# Patient Record
Sex: MALE | Race: OTHER | Employment: UNEMPLOYED | URBAN - NONMETROPOLITAN AREA
[De-identification: names, ages, dates, MRNs, and addresses within clinical notes are randomized per-mention and may not be internally consistent; named-entity substitution may affect disease eponyms.]

---
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
## 2020-03-31 ENCOUNTER — HOSPITAL ENCOUNTER (INPATIENT)
Age: 20
LOS: 4 days | Discharge: HOME | DRG: 885 | End: 2020-04-04
Attending: PSYCHIATRY & NEUROLOGY | Admitting: PSYCHIATRY & NEUROLOGY
Payer: COMMERCIAL

## 2020-03-31 ENCOUNTER — HOSPITAL ENCOUNTER (EMERGENCY)
Age: 20
Discharge: ANOTHER HOSPITAL | End: 2020-03-31
Attending: FAMILY MEDICINE
Payer: COMMERCIAL

## 2020-03-31 ENCOUNTER — APPOINTMENT (OUTPATIENT)
Dept: GENERAL RADIOLOGY | Age: 20
End: 2020-03-31
Payer: COMMERCIAL

## 2020-03-31 VITALS
HEART RATE: 88 BPM | WEIGHT: 140 LBS | DIASTOLIC BLOOD PRESSURE: 82 MMHG | OXYGEN SATURATION: 100 % | RESPIRATION RATE: 24 BRPM | SYSTOLIC BLOOD PRESSURE: 123 MMHG | TEMPERATURE: 98.4 F

## 2020-03-31 PROBLEM — F33.9 MAJOR DEPRESSION, RECURRENT, CHRONIC (HCC): Status: ACTIVE | Noted: 2020-03-31

## 2020-03-31 LAB
ABSOLUTE EOS #: 0 K/UL (ref 0–0.4)
ABSOLUTE IMMATURE GRANULOCYTE: ABNORMAL K/UL (ref 0–0.3)
ABSOLUTE LYMPH #: 1.4 K/UL (ref 1.2–5.2)
ABSOLUTE MONO #: 0.6 K/UL (ref 0–1)
ACETAMINOPHEN LEVEL: <5 UG/ML (ref 10–30)
ALBUMIN SERPL-MCNC: 5 G/DL (ref 3.5–5.2)
ALBUMIN/GLOBULIN RATIO: ABNORMAL (ref 1–2.5)
ALP BLD-CCNC: 88 U/L (ref 40–129)
ALT SERPL-CCNC: 13 U/L (ref 5–41)
AMPHETAMINE SCREEN URINE: NEGATIVE
ANION GAP SERPL CALCULATED.3IONS-SCNC: 15 MMOL/L (ref 9–17)
AST SERPL-CCNC: 11 U/L
BARBITURATE SCREEN URINE: NEGATIVE
BASOPHILS # BLD: 0 % (ref 0–2)
BASOPHILS ABSOLUTE: 0 K/UL (ref 0–0.2)
BENZODIAZEPINE SCREEN, URINE: NEGATIVE
BILIRUB SERPL-MCNC: 0.35 MG/DL (ref 0.3–1.2)
BILIRUBIN URINE: NEGATIVE
BUN BLDV-MCNC: 9 MG/DL (ref 6–20)
BUN/CREAT BLD: 13 (ref 9–20)
BUPRENORPHINE URINE: ABNORMAL
CALCIUM SERPL-MCNC: 10.3 MG/DL (ref 8.6–10.4)
CANNABINOID SCREEN URINE: POSITIVE
CHLORIDE BLD-SCNC: 100 MMOL/L (ref 98–107)
CO2: 24 MMOL/L (ref 20–31)
COCAINE METABOLITE, URINE: NEGATIVE
COLOR: YELLOW
COMMENT UA: NORMAL
CREAT SERPL-MCNC: 0.7 MG/DL (ref 0.7–1.2)
DIFFERENTIAL TYPE: YES
EKG ATRIAL RATE: 102 BPM
EKG ATRIAL RATE: 96 BPM
EKG P AXIS: 47 DEGREES
EKG P AXIS: 54 DEGREES
EKG P-R INTERVAL: 132 MS
EKG P-R INTERVAL: 136 MS
EKG Q-T INTERVAL: 336 MS
EKG Q-T INTERVAL: 338 MS
EKG QRS DURATION: 76 MS
EKG QRS DURATION: 86 MS
EKG QTC CALCULATION (BAZETT): 427 MS
EKG QTC CALCULATION (BAZETT): 437 MS
EKG R AXIS: 12 DEGREES
EKG R AXIS: 24 DEGREES
EKG T AXIS: 47 DEGREES
EKG T AXIS: 52 DEGREES
EKG VENTRICULAR RATE: 102 BPM
EKG VENTRICULAR RATE: 96 BPM
EOSINOPHILS RELATIVE PERCENT: 0 % (ref 0–5)
ETHANOL PERCENT: <0.01 %
ETHANOL: <10 MG/DL
GFR AFRICAN AMERICAN: ABNORMAL ML/MIN
GFR NON-AFRICAN AMERICAN: ABNORMAL ML/MIN
GFR SERPL CREATININE-BSD FRML MDRD: ABNORMAL ML/MIN/{1.73_M2}
GFR SERPL CREATININE-BSD FRML MDRD: ABNORMAL ML/MIN/{1.73_M2}
GLUCOSE BLD-MCNC: 101 MG/DL (ref 70–99)
GLUCOSE URINE: NEGATIVE
HCT VFR BLD CALC: 46.1 % (ref 41–53)
HEMOGLOBIN: 15.4 G/DL (ref 13.5–17.5)
IMMATURE GRANULOCYTES: ABNORMAL %
KETONES, URINE: NEGATIVE
LEUKOCYTE ESTERASE, URINE: NEGATIVE
LYMPHOCYTES # BLD: 12 % (ref 13–44)
MAGNESIUM: 2.4 MG/DL (ref 1.7–2.2)
MCH RBC QN AUTO: 28.7 PG (ref 26–34)
MCHC RBC AUTO-ENTMCNC: 33.4 G/DL (ref 31–37)
MCV RBC AUTO: 86.1 FL (ref 80–100)
MDMA URINE: ABNORMAL
METHADONE SCREEN, URINE: NEGATIVE
METHAMPHETAMINE, URINE: NEGATIVE
MONOCYTES # BLD: 6 % (ref 5–9)
NITRITE, URINE: NEGATIVE
NRBC AUTOMATED: ABNORMAL PER 100 WBC
OPIATES, URINE: NEGATIVE
OXYCODONE SCREEN URINE: NEGATIVE
PDW BLD-RTO: 12.7 % (ref 12.1–15.2)
PH UA: 6 (ref 5–8)
PHENCYCLIDINE, URINE: NEGATIVE
PLATELET # BLD: 193 K/UL (ref 140–450)
PLATELET ESTIMATE: ABNORMAL
PMV BLD AUTO: ABNORMAL FL (ref 6–12)
POTASSIUM SERPL-SCNC: 3.4 MMOL/L (ref 3.7–5.3)
PROPOXYPHENE, URINE: NEGATIVE
PROTEIN UA: NEGATIVE
RBC # BLD: 5.35 M/UL (ref 4.5–5.9)
RBC # BLD: ABNORMAL 10*6/UL
SALICYLATE LEVEL: <1 MG/DL (ref 3–10)
SEG NEUTROPHILS: 82 % (ref 39–75)
SEGMENTED NEUTROPHILS ABSOLUTE COUNT: 9.3 K/UL (ref 2.1–6.5)
SODIUM BLD-SCNC: 139 MMOL/L (ref 135–144)
SPECIFIC GRAVITY UA: 1.02 (ref 1–1.03)
TEST INFORMATION: ABNORMAL
TOTAL PROTEIN: 8.5 G/DL (ref 6.4–8.3)
TRICYCLIC ANTIDEPRESSANTS, UR: NEGATIVE
TURBIDITY: CLEAR
URINE HGB: NEGATIVE
UROBILINOGEN, URINE: NORMAL
WBC # BLD: 11.3 K/UL (ref 4.5–13.5)
WBC # BLD: ABNORMAL 10*3/UL

## 2020-03-31 PROCEDURE — 83735 ASSAY OF MAGNESIUM: CPT

## 2020-03-31 PROCEDURE — 80053 COMPREHEN METABOLIC PANEL: CPT

## 2020-03-31 PROCEDURE — G0480 DRUG TEST DEF 1-7 CLASSES: HCPCS

## 2020-03-31 PROCEDURE — 81003 URINALYSIS AUTO W/O SCOPE: CPT

## 2020-03-31 PROCEDURE — 80307 DRUG TEST PRSMV CHEM ANLYZR: CPT

## 2020-03-31 PROCEDURE — 36415 COLL VENOUS BLD VENIPUNCTURE: CPT

## 2020-03-31 PROCEDURE — 6370000000 HC RX 637 (ALT 250 FOR IP): Performed by: PSYCHIATRY & NEUROLOGY

## 2020-03-31 PROCEDURE — 93005 ELECTROCARDIOGRAM TRACING: CPT | Performed by: FAMILY MEDICINE

## 2020-03-31 PROCEDURE — 1240000000 HC EMOTIONAL WELLNESS R&B

## 2020-03-31 PROCEDURE — 85025 COMPLETE CBC W/AUTO DIFF WBC: CPT

## 2020-03-31 PROCEDURE — 71045 X-RAY EXAM CHEST 1 VIEW: CPT

## 2020-03-31 PROCEDURE — 93010 ELECTROCARDIOGRAM REPORT: CPT | Performed by: INTERNAL MEDICINE

## 2020-03-31 PROCEDURE — 6370000000 HC RX 637 (ALT 250 FOR IP): Performed by: NURSE PRACTITIONER

## 2020-03-31 PROCEDURE — 80306 DRUG TEST PRSMV INSTRMNT: CPT

## 2020-03-31 PROCEDURE — 99285 EMERGENCY DEPT VISIT HI MDM: CPT

## 2020-03-31 RX ORDER — PROMETHAZINE HYDROCHLORIDE 25 MG/ML
12.5 INJECTION, SOLUTION INTRAMUSCULAR; INTRAVENOUS ONCE
Status: DISCONTINUED | OUTPATIENT
Start: 2020-03-31 | End: 2020-04-04 | Stop reason: HOSPADM

## 2020-03-31 RX ORDER — BENZTROPINE MESYLATE 1 MG/ML
2 INJECTION INTRAMUSCULAR; INTRAVENOUS 2 TIMES DAILY PRN
Status: DISCONTINUED | OUTPATIENT
Start: 2020-03-31 | End: 2020-04-04 | Stop reason: HOSPADM

## 2020-03-31 RX ORDER — LOPERAMIDE HYDROCHLORIDE 2 MG/1
2 CAPSULE ORAL 4 TIMES DAILY PRN
Status: DISCONTINUED | OUTPATIENT
Start: 2020-03-31 | End: 2020-04-04 | Stop reason: HOSPADM

## 2020-03-31 RX ORDER — HYDROXYZINE HYDROCHLORIDE 25 MG/1
25 TABLET, FILM COATED ORAL 3 TIMES DAILY PRN
Status: DISCONTINUED | OUTPATIENT
Start: 2020-03-31 | End: 2020-04-04 | Stop reason: HOSPADM

## 2020-03-31 RX ORDER — ONDANSETRON 4 MG/1
4 TABLET, ORALLY DISINTEGRATING ORAL EVERY 8 HOURS PRN
Status: DISCONTINUED | OUTPATIENT
Start: 2020-03-31 | End: 2020-04-04 | Stop reason: HOSPADM

## 2020-03-31 RX ORDER — NICOTINE 21 MG/24HR
1 PATCH, TRANSDERMAL 24 HOURS TRANSDERMAL DAILY
Status: DISCONTINUED | OUTPATIENT
Start: 2020-03-31 | End: 2020-04-01

## 2020-03-31 RX ORDER — MAGNESIUM HYDROXIDE/ALUMINUM HYDROXICE/SIMETHICONE 120; 1200; 1200 MG/30ML; MG/30ML; MG/30ML
30 SUSPENSION ORAL EVERY 6 HOURS PRN
Status: DISCONTINUED | OUTPATIENT
Start: 2020-03-31 | End: 2020-04-04 | Stop reason: HOSPADM

## 2020-03-31 RX ORDER — TRAZODONE HYDROCHLORIDE 50 MG/1
50 TABLET ORAL NIGHTLY PRN
Status: DISCONTINUED | OUTPATIENT
Start: 2020-03-31 | End: 2020-04-04 | Stop reason: HOSPADM

## 2020-03-31 RX ORDER — VILAZODONE HYDROCHLORIDE 10 MG/1
10 TABLET ORAL DAILY
Status: DISCONTINUED | OUTPATIENT
Start: 2020-03-31 | End: 2020-04-01

## 2020-03-31 RX ORDER — ACETAMINOPHEN 325 MG/1
650 TABLET ORAL EVERY 4 HOURS PRN
Status: DISCONTINUED | OUTPATIENT
Start: 2020-03-31 | End: 2020-04-04 | Stop reason: HOSPADM

## 2020-03-31 RX ADMIN — VILAZODONE HYDROCHLORIDE 10 MG: 10 TABLET ORAL at 16:00

## 2020-03-31 RX ADMIN — LOPERAMIDE HYDROCHLORIDE 2 MG: 2 CAPSULE ORAL at 20:07

## 2020-03-31 RX ADMIN — ONDANSETRON 4 MG: 4 TABLET, ORALLY DISINTEGRATING ORAL at 20:07

## 2020-03-31 ASSESSMENT — LIFESTYLE VARIABLES: HISTORY_ALCOHOL_USE: NO

## 2020-03-31 ASSESSMENT — SLEEP AND FATIGUE QUESTIONNAIRES
AVERAGE NUMBER OF SLEEP HOURS: 6
DO YOU HAVE DIFFICULTY SLEEPING: NO
DO YOU USE A SLEEP AID: NO

## 2020-03-31 ASSESSMENT — PATIENT HEALTH QUESTIONNAIRE - PHQ9: SUM OF ALL RESPONSES TO PHQ QUESTIONS 1-9: 4
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
## 2020-03-31 NOTE — ED NOTES
Pt provided keys to staff as his father will be coming to pick-up his car. [place] were left with Tamar Siddiqui at the  with father's name.      Kimberly Rogers RN  03/31/20 2787

## 2020-03-31 NOTE — GROUP NOTE
Group Therapy Note    Date: 3/31/2020    Group Start Time: 1430  Group End Time: 3291  Group Topic: Psychoeducation    [address] [facility]      Patient declined to attend recreational therapy group at 1430 despite encouragement from staff. 1:1 talk time offered by staff as alternative to group session.         Signature:  Frandy Germain

## 2020-03-31 NOTE — ED PROVIDER NOTES
 Stress: None   Relationships    Social connections     Talks on phone: None     Gets together: None     Attends Jehovah's witness service: None     Active member of club or organization: None     Attends meetings of clubs or organizations: None     Relationship status: None    Intimate partner violence     Fear of current or ex partner: None     Emotionally abused: None     Physically abused: None     Forced sexual activity: None   Other Topics Concern    None   Social History Narrative    None       PHYSICAL EXAM    VITAL SIGNS: /64   Pulse 95   Temp 98.7 °F (37.1 °C) (Oral)   Resp 19   Wt 140 lb (63.5 kg)   SpO2 100%    Constitutional:  Well developed, well nourished, 80-year-old male with flat affect, no acute distress   HENT:  Atraumatic, external ears normal, nose normal, oropharynx moist. Neck- supple trachea midline. The thyroid is nontender and not enlarged. Respiratory: Normal breath sounds. No breathing difficulty. Cardiovascular: Regular rate and rhythm. No murmur is heard. GI:  Soft, nondistended, normal bowel sounds, nontender, no organomegaly, no mass, no rebound, no guarding   Musculoskeletal:  No edema, no tenderness, no deformities. Back- no tenderness   Integument:  Well hydrated, no rash   Neurologic:  Alert & oriented x 3, no focal deficits noted. Mentation is appropriate and patient is aware of his actions. No gross motor deficit. His gait is normal.  DTRs are brisk and symmetrical.    EKG    Normal sinus rhythm with tachycardia at 102. Normal EKG. MO interval is 136. QT interval is 336. RADIOLOGY/PROCEDURES    Normal chest x-ray    ED COURSE & MEDICAL DECISION MAKING    Pertinent Labs & Imaging studies reviewed. (See chart for details)  80-year-old male with suicidal attempt from taking diphenhydramine. Poison control was contacted. No contagious illness. No concern for isolation. His lab is evaluated and chest x-ray normal.  Observation period is stable.   The

## 2020-04-01 LAB
CHOLESTEROL/HDL RATIO: 2.9
CHOLESTEROL: 152 MG/DL
ESTIMATED AVERAGE GLUCOSE: 105 MG/DL
HBA1C MFR BLD: 5.3 % (ref 4–6)
HDLC SERPL-MCNC: 53 MG/DL
LDL CHOLESTEROL: 86 MG/DL (ref 0–130)
THYROXINE, FREE: 1.49 NG/DL (ref 0.93–1.7)
TRIGL SERPL-MCNC: 64 MG/DL
TSH SERPL DL<=0.05 MIU/L-ACNC: 0.61 MIU/L (ref 0.3–5)
VLDLC SERPL CALC-MCNC: NORMAL MG/DL (ref 1–30)

## 2020-04-01 PROCEDURE — 90792 PSYCH DIAG EVAL W/MED SRVCS: CPT | Performed by: NURSE PRACTITIONER

## 2020-04-01 PROCEDURE — 36415 COLL VENOUS BLD VENIPUNCTURE: CPT

## 2020-04-01 PROCEDURE — 83036 HEMOGLOBIN GLYCOSYLATED A1C: CPT

## 2020-04-01 PROCEDURE — 1240000000 HC EMOTIONAL WELLNESS R&B

## 2020-04-01 PROCEDURE — 6370000000 HC RX 637 (ALT 250 FOR IP): Performed by: PSYCHIATRY & NEUROLOGY

## 2020-04-01 PROCEDURE — 6370000000 HC RX 637 (ALT 250 FOR IP): Performed by: NURSE PRACTITIONER

## 2020-04-01 PROCEDURE — 80061 LIPID PANEL: CPT

## 2020-04-01 PROCEDURE — 84439 ASSAY OF FREE THYROXINE: CPT

## 2020-04-01 PROCEDURE — 84443 ASSAY THYROID STIM HORMONE: CPT

## 2020-04-01 RX ORDER — SERTRALINE HYDROCHLORIDE 25 MG/1
25 TABLET, FILM COATED ORAL DAILY
Status: DISCONTINUED | OUTPATIENT
Start: 2020-04-01 | End: 2020-04-04 | Stop reason: HOSPADM

## 2020-04-01 RX ADMIN — VILAZODONE HYDROCHLORIDE 10 MG: 10 TABLET ORAL at 08:57

## 2020-04-01 RX ADMIN — SERTRALINE HYDROCHLORIDE 25 MG: 25 TABLET ORAL at 15:27

## 2020-04-01 ASSESSMENT — ENCOUNTER SYMPTOMS
VOMITING: 1
SHORTNESS OF BREATH: 0
CONSTIPATION: 0
ABDOMINAL PAIN: 0
COUGH: 0
NAUSEA: 1
DIARRHEA: 0
BACK PAIN: 0
WHEEZING: 0

## 2020-04-01 ASSESSMENT — SLEEP AND FATIGUE QUESTIONNAIRES
DIFFICULTY STAYING ASLEEP: YES
DIFFICULTY ARISING: NO
DO YOU USE A SLEEP AID: NO
AVERAGE NUMBER OF SLEEP HOURS: 8
SLEEP PATTERN: DIFFICULTY FALLING ASLEEP
DO YOU HAVE DIFFICULTY SLEEPING: YES
DIFFICULTY FALLING ASLEEP: YES
RESTFUL SLEEP: NO

## 2020-04-01 ASSESSMENT — LIFESTYLE VARIABLES: HISTORY_ALCOHOL_USE: NO

## 2020-04-01 NOTE — GROUP NOTE
Group Therapy Note    Date: 4/1/2020    Group Start Time: 1000  Group End Time: 5686  Group Topic: Psychotherapy    [facility] YAMILE Krueger        Group Therapy Note    Attendees: 7/14         Patient's Goal:  Strengths based gratitude exercise     Notes:      Status After Intervention:  Unchanged    Participation Level:  Active Listener and Interactive    Participation Quality: Appropriate and Attentive      Speech:  normal      Thought Process/Content: Logical      Affective Functioning: Flat      Mood: depressed      Level of consciousness:  Alert and Oriented x4      Response to Learning: Able to verbalize current knowledge/experience and Able to verbalize/acknowledge new learning      Endings: None Reported    Modes of Intervention: Education and Support      Discipline Responsible: /Counselor      Signature:  YAMILE Puga

## 2020-04-01 NOTE — GROUP NOTE
Group Therapy Note    Date: 4/1/2020    Group Start Time: 1100  Group End Time: 8214  Group Topic: Psychoeducation    JIMENEZ See, SOFÍAS    Patient refused to attend leisure skills group at 1100 after encouragement from staff. 1:1 talk time offered by staff as alternative to group session.

## 2020-04-01 NOTE — PLAN OF CARE
[facility]  Initial Interdisciplinary Treatment Plan NO      Original treatment plan Date & Time: 4/1/2020 0847    Admission Type:  Admission Type: Voluntary    Reason for admission:   Reason for Admission: suicide attempt via overdose on benadryl     Estimated Length of Stay:  5-7days  Estimated Discharge Date: to be determined by physician    PATIENT STRENGTHS:  Patient Strengths:Strengths: Communication, Social Skills, No significant Physical Illness  Patient Strengths and Limitations:Limitations: Tendency to isolate self  Addictive Behavior: Addictive Behavior  In the past 3 months, have you felt or has someone told you that you have a problem with:  : None  Do you have a history of Chemical Use?: No  Do you have a history of Alcohol Use?: No  Do you have a history of Street Drug Abuse?: Yes  Histroy of Prescripton Drug Abuse?: No  Medical Problems:  Past Medical History:   Diagnosis Date    Psychiatric problem      Status EXAM:Status and Exam  Normal: No  Facial Expression: Flat  Affect: Appropriate  Level of Consciousness: Alert  Mood:Normal: No  Mood: Anxious, Depressed  Motor Activity:Normal: Yes  Interview Behavior: Cooperative  Preception: [place] to Person, [place] G[place] to Time, [place] to Place, [place] to Situation  Attention:Normal: Yes  Thought Content:Normal: No  Thought Content: Poverty of Content  Hallucinations: None  Delusions: No  Memory:Normal: Yes  Insight and Judgment: No  Insight and Judgment: Poor Judgment, Poor Insight  Present Suicidal Ideation: No  Present Homicidal Ideation: No    EDUCATION:   Learner Progress Toward Treatment Goals: reviewed group plans and strategies for care    Method:group therapy, medication compliance, individualized assessments and care planning    Outcome: needs reinforcement    PATIENT GOALS: to be discussed with patient within 72 hours    PLAN/TREATMENT RECOMMENDATIONS:     continue group therapy , medications compliance, goal setting, individualized assessments and care, continue to monitor pt on unit      SHORT-TERM GOALS:   Time frame for Short-Term Goals: 5-7 days    LONG-TERM GOALS:  Time frame for Long-Term Goals: 6 months  Members Present in Team Meeting: See Signature Sheet    Poonam Moore

## 2020-04-01 NOTE — CARE COORDINATION
[facility] Biopsychosocial Assessment    Current Level of Psychosocial Functioning     Independent x  Dependent    Minimal Assist     Comments:    Psychosocial High Risk Factors (check all that apply)    Unable to obtain meds   Chronic illness/pain    Substance abuse   Lack of Family Support x  Financial stress   Isolation   Inadequate Community Resources  Suicide attempt(s)  Not taking medications   Victim of crime   Developmental Delay  Unable to manage personal needs    Age 72 or older   Homeless  No transportation   Readmission within 30 days  Unemployment  Traumatic Event    Comments:   Psychiatric Advanced Directives: pt denies     Family to Involve in Treatment: pt reports he lives with mom/dad who are not supportive     Sexual Orientation:  N/A    Patient Strengths: pt reports he has stable housing, is a college student, has individual therapist     Patient Barriers: pt reports ongoing conflict with parents (per patient conflict is since patient has come out to his parents), pt reports symptoms of depression and anxiety have lessened his ability to perform well with school      Opiate Education Provided:  Pt denies       CMHC/mental health history: Pt is linked with private therapist in [facility], will need to be linked with psychiatric provider for medications. Plan of Care   medication management, group/individual therapies, family meetings, psycho -education, treatment team meetings to assist with stabilization    Initial Discharge Plan:  Pt reports he will return home at discharge. Clinical Summary:  Bill Blank is a 23year old single male who has been admitted to [place] [address] with report of suicide attempt by overdose on over-the-counter sleeping medications. Pt reports history of depressive and anxious symptoms including helpless, hopeless thinking, poor mood, sleep disturbance, racing thoughts, crying episodes.  Pt states he lives with his mother and father with whom he has conflict since he has come out as gentile to them; pt reports he is not \"allowed\" to talk about his feelings or his sexual orientation inside the family home. Pt states his parents believe he should \"[facility] [address] and pray it away.:\" SW provided therapeutic listening and support, offered community resources including link to psychiatric provider if patient proceeds with starting medications addressing his mental health. Pt reports no other suicide attempts other than that prior to his admission. Pt denies AOD issues, pt denies legal issues. Pt reports he has supportive friends.
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
## 2020-04-02 PROCEDURE — 6370000000 HC RX 637 (ALT 250 FOR IP): Performed by: NURSE PRACTITIONER

## 2020-04-02 PROCEDURE — 6370000000 HC RX 637 (ALT 250 FOR IP): Performed by: REGISTERED NURSE

## 2020-04-02 PROCEDURE — 1240000000 HC EMOTIONAL WELLNESS R&B

## 2020-04-02 PROCEDURE — 99232 SBSQ HOSP IP/OBS MODERATE 35: CPT | Performed by: NURSE PRACTITIONER

## 2020-04-02 PROCEDURE — 90833 PSYTX W PT W E/M 30 MIN: CPT | Performed by: NURSE PRACTITIONER

## 2020-04-02 RX ADMIN — TRAZODONE HYDROCHLORIDE 50 MG: 50 TABLET ORAL at 21:53

## 2020-04-02 RX ADMIN — SERTRALINE HYDROCHLORIDE 25 MG: 25 TABLET ORAL at 08:16

## 2020-04-02 NOTE — PROGRESS NOTES
Leukocyte Esterase, Urine NEGATIVE NEGATIVE    Urinalysis Comments         Ethanol    Collection Time: 03/31/20  2:49 AM   Result Value Ref Range    Ethanol <10 <10 mg/dL    Ethanol percent <0.010 %   Acetaminophen level    Collection Time: 03/31/20  2:49 AM   Result Value Ref Range    Acetaminophen Level <5 (L) 10 - 30 ug/mL   Salicylate    Collection Time: 03/31/20  2:49 AM   Result Value Ref Range    Salicylate Lvl <1 (L) 3 - 10 mg/dL   Comprehensive Metabolic Panel w/ Reflex to MG    Collection Time: 03/31/20  2:49 AM   Result Value Ref Range    Glucose 101 (H) 70 - 99 mg/dL    BUN 9 6 - 20 mg/dL    CREATININE 0.70 0.70 - 1.20 mg/dL    Bun/Cre Ratio 13 9 - 20    Calcium 10.3 8.6 - 10.4 mg/dL    Sodium 139 135 - 144 mmol/L    Potassium 3.4 (L) 3.7 - 5.3 mmol/L    Chloride 100 98 - 107 mmol/L    CO2 24 20 - 31 mmol/L    Anion Gap 15 9 - 17 mmol/L    Alkaline Phosphatase 88 40 - 129 U/L    ALT 13 5 - 41 U/L    AST 11 <40 U/L    Total Bilirubin 0.35 0.30 - 1.20 mg/dL    Total Protein 8.5 (H) 6.4 - 8.3 g/dL    Alb 5.0 3.5 - 5.2 g/dL    Albumin/Globulin Ratio NOT REPORTED 1.0 - 2.5    GFR Non-African American  >60 mL/min     Pediatric GFR requires additional information. Refer to [facility] website for calculator.     GFR  NOT REPORTED >60 mL/min    GFR Comment          GFR Staging NOT REPORTED    CBC Auto Differential    Collection Time: 03/31/20  2:49 AM   Result Value Ref Range    WBC 11.3 4.5 - 13.5 k/uL    RBC 5.35 4.5 - 5.9 m/uL    Hemoglobin 15.4 13.5 - 17.5 g/dL    Hematocrit 46.1 41 - 53 %    MCV 86.1 80 - 100 fL    MCH 28.7 26 - 34 pg    MCHC 33.4 31 - 37 g/dL    RDW 12.7 12.1 - 15.2 %    Platelets 259 140 - 614 k/uL    MPV NOT REPORTED 6.0 - 12.0 fL    NRBC Automated NOT REPORTED per 100 WBC    Differential Type YES     Seg Neutrophils 82 (H) 39 - 75 %    Lymphocytes 12 (L) 13 - 44 %    Monocytes 6 5 - 9 %    Eosinophils % 0 0 - 5 %    Basophils 0 0 - 2 %    Immature Granulocytes NOT REPORTED 0 % Segs Absolute 9.30 (H) 2.1 - 6.5 k/uL    Absolute Lymph # 1.40 1.2 - 5.2 k/uL    Absolute Mono # 0.60 0.0 - 1.0 k/uL    Absolute Eos # 0.00 0.0 - 0.4 k/uL    Basophils Absolute 0.00 0.0 - 0.2 k/uL    Absolute Immature Granulocyte NOT REPORTED 0.00 - 0.30 k/uL    WBC Morphology NOT REPORTED     RBC Morphology NOT REPORTED     Platelet Estimate NOT REPORTED    Magnesium    Collection Time: 03/31/20  2:49 AM   Result Value Ref Range    Magnesium 2.4 (H) 1.7 - 2.2 mg/dL   EKG 12 Lead    Collection Time: 03/31/20  4:08 AM   Result Value Ref Range    Ventricular Rate 96 BPM    Atrial Rate 96 BPM    P-R Interval 132 ms    QRS Duration 76 ms    Q-T Interval 338 ms    QTc Calculation (Bazett) 427 ms    P Axis 54 degrees    R Axis 24 degrees    T Axis 52 degrees   Hemoglobin A1c    Collection Time: 04/01/20  6:36 AM   Result Value Ref Range    Hemoglobin A1C 5.3 4.0 - 6.0 %    Estimated Avg Glucose 105 mg/dL   TSH without Reflex    Collection Time: 04/01/20  6:36 AM   Result Value Ref Range    TSH 0.61 0.30 - 5.00 mIU/L   T4, free    Collection Time: 04/01/20  6:36 AM   Result Value Ref Range    Thyroxine, Free 1.49 0.93 - 1.70 ng/dL   Lipid panel - fasting    Collection Time: 04/01/20  6:36 AM   Result Value Ref Range    Cholesterol 152 <200 mg/dL    HDL 53 >40 mg/dL    LDL Cholesterol 86 0 - 130 mg/dL    Chol/HDL Ratio 2.9 <5    Triglycerides 64 <150 mg/dL    VLDL NOT REPORTED 1 - 30 mg/dL       Medications  Current Facility-Administered Medications   Medication Dose Route Frequency Provider Last Rate Last Dose    sertraline (ZOLOFT) tablet 25 mg  25 mg Oral Daily CRAIG Kaplan - CNP   25 mg at 04/02/20 5420    hydrOXYzine (ATARAX) tablet 25 mg  25 mg Oral TID PRN Osiel Mckinney APRN - CNS        acetaminophen (TYLENOL) tablet 650 mg  650 mg Oral Q4H PRN Mallika Jewell APRN - CNS        traZODone (DESYREL) tablet 50 mg  50 mg Oral Nightly PRN Mallika Belcher APRN - CNS        benztropine mesylate (COGENTIN)

## 2020-04-02 NOTE — GROUP NOTE
Group Therapy Note    Date: 4/2/2020    Group Start Time: 1600  Group End Time: 1640  Group Topic: Group Documentation    [facility]    Edgar Acuña LPN        Group Therapy Note    Attendees: 6/8         Patient's Goal:  particpate    Notes:  motivate    Status After Intervention:  Unchanged    Participation Level: Interactive    Participation Quality: Attentive      Speech:  normal      Thought Process/Content: Logical      Affective Functioning: Flat      Mood: anxious      Level of consciousness:  Alert      Response to Learning: Able to verbalize/acknowledge new learning      Endings: None Reported    Modes of Intervention: Education      Discipline Responsible: Licensed Practical Nurse      Signature:  Edgar Acuña LPN

## 2020-04-02 NOTE — GROUP NOTE
Group Therapy Note    Date: 4/2/2020    Group Start Time: 1000  Group End Time: 1020  Group Topic: Psychoeducation    [facility]    OSCAR Charles, KINZAW        Group Therapy Note    Attendees: 11         Patient's Goal:  Pt will demonstrate increased interpersonal interaction.     Notes:  Topic was the Anger Iceberg    Status After Intervention:  Improved    Participation Level: Interactive    Participation Quality: Appropriate      Speech:  normal      Thought Process/Content: Logical      Affective Functioning: Congruent      Mood: elevated      Level of consciousness:  Alert      Response to Learning: Progressing to goal      Endings: None Reported    Modes of Intervention: Education      Discipline Responsible: /Counselor      Signature:  OSCAR Charles, YAMILE

## 2020-04-02 NOTE — PLAN OF CARE
Problem: Suicide risk  Goal: Provide patient with safe environment  Description: Provide patient with safe environment  4/1/2020 2050 by Heath West RN  Outcome: Ongoing  Note: Patient is currently on a locked psychiatric unit where every 15 minute checks are performed on patients for safety. Problem: Depressive Behavior With or Without Suicide Precautions:  Goal: Able to verbalize acceptance of life and situations over which he or she has no control  Description: Able to verbalize acceptance of life and situations over which he or she has no control  4/1/2020 2050 by Heath West RN  Outcome: Ongoing  Note: Pt did not want to discuss this at this time. Problem: Depressive Behavior With or Without Suicide Precautions:  Goal: Able to verbalize and/or display a decrease in depressive symptoms  Description: Able to verbalize and/or display a decrease in depressive symptoms  4/1/2020 2050 by Heath West RN  Outcome: Ongoing  Note: Patient denies any depression currently. Pt has brightened affect. Pt is out in dayroom putting a puzzle together. Pt expresses desire to be discharged. Problem: Depressive Behavior With or Without Suicide Precautions:  Goal: Ability to disclose and discuss suicidal ideas will improve  Description: Ability to disclose and discuss suicidal ideas will improve  4/1/2020 2050 by Heath West RN  Outcome: Ongoing  Note: Patient denies any thoughts to suicidal ideations and no signs of self harm were noted. Patient encouraged to inform staff if he starts to develop any thoughts to harm self. Patient voiced understanding.

## 2020-04-02 NOTE — GROUP NOTE
Group Therapy Note    Date: 4/2/2020    Group Start Time: 1100  Group End Time: 2222  Group Topic: Psychoeducation    [facility]    Judy Cobos        Group Therapy Note    Attendees: 5/9         Patient's Goal:  To increase interpersonal interaction    Notes:      Status After Intervention:  Improved    Participation Level:  Active Listener and Interactive    Participation Quality: Appropriate, Attentive and Sharing      Speech:  normal      Thought Process/Content: Logical  Linear      Affective Functioning: Constricted/Restricted      Mood: anxious      Level of consciousness:  Alert, Oriented x4 and Attentive      Response to Learning: Able to verbalize current knowledge/experience, Able to verbalize/acknowledge new learning, Able to retain information and Progressing to goal      Endings: None Reported    Modes of Intervention: Education, Support, Socialization, Exploration, Clarifying, Problem-solving and Activity      Discipline Responsible: Psychoeducational Specialist      Signature:  Judy Cobos

## 2020-04-02 NOTE — GROUP NOTE
Group Therapy Note    Date: 4/2/2020    Group Start Time: 0900  Group End Time: 0920  Group Topic: Community Meeting    JIMENEZ VELOZ    Donette Hodgkins        Group Therapy Note    Attendees: 8/12         Patient's Goal:  To increase interpersonal interaction    Notes:      Status After Intervention:  Improved    Participation Level:  Active Listener and Interactive    Participation Quality: Appropriate and Sharing      Speech:  normal      Thought Process/Content: Logical      Affective Functioning: Constricted/Restricted      Mood: depressed      Level of consciousness:  Alert and Oriented x4      Response to Learning: Able to verbalize current knowledge/experience, Able to verbalize/acknowledge new learning, Able to retain information and Progressing to goal      Endings: None Reported    Modes of Intervention: Education, Support, Socialization, Exploration, Clarifying, Problem-solving and Activity      Discipline Responsible: Psychoeducational Specialist      Signature:  Donette Hodgkins

## 2020-04-03 PROBLEM — F33.9 MAJOR DEPRESSION, RECURRENT, CHRONIC (HCC): Status: RESOLVED | Noted: 2020-03-31 | Resolved: 2020-04-03

## 2020-04-03 PROCEDURE — 1240000000 HC EMOTIONAL WELLNESS R&B

## 2020-04-03 PROCEDURE — 99232 SBSQ HOSP IP/OBS MODERATE 35: CPT | Performed by: NURSE PRACTITIONER

## 2020-04-03 PROCEDURE — 6370000000 HC RX 637 (ALT 250 FOR IP): Performed by: NURSE PRACTITIONER

## 2020-04-03 PROCEDURE — 6370000000 HC RX 637 (ALT 250 FOR IP): Performed by: REGISTERED NURSE

## 2020-04-03 RX ORDER — SERTRALINE HYDROCHLORIDE 25 MG/1
25 TABLET, FILM COATED ORAL DAILY
Qty: 30 TABLET | Refills: 0 | Status: ON HOLD | OUTPATIENT
Start: 2020-04-04 | End: 2020-06-18 | Stop reason: HOSPADM

## 2020-04-03 RX ORDER — TRAZODONE HYDROCHLORIDE 50 MG/1
50 TABLET ORAL NIGHTLY PRN
Qty: 30 TABLET | Refills: 0 | Status: ON HOLD | OUTPATIENT
Start: 2020-04-03 | End: 2020-06-18 | Stop reason: HOSPADM

## 2020-04-03 RX ADMIN — SERTRALINE HYDROCHLORIDE 25 MG: 25 TABLET ORAL at 08:25

## 2020-04-03 RX ADMIN — TRAZODONE HYDROCHLORIDE 50 MG: 50 TABLET ORAL at 21:13

## 2020-04-03 NOTE — GROUP NOTE
Group Therapy Note    Date: 4/3/2020    Group Start Time: 1340  Group End Time: 5777  Group Topic: Psychoeducation    JIMENEZ Westbrook, SOFÍAS    Group Therapy Note    Attendees: 6    Patient's Goal:  Pt will demonstrate improved interpersonal skills    Notes:  Pt attended group and participated    Status After Intervention:  Improved    Participation Level:  Active Listener and Interactive    Participation Quality: Appropriate, Attentive, Sharing and Supportive      Speech:  normal      Thought Process/Content: Logical  Linear      Affective Functioning: Congruent      Mood: Anxious      Level of consciousness:  Alert, Oriented x4 and Attentive      Response to Learning: Able to verbalize current knowledge/experience, Able to verbalize/acknowledge new learning, Able to retain information, Capable of insight, Able to change behavior and Progressing to goal      Endings: None Reported    Modes of Intervention: Education, Support, Socialization, Exploration and Clarifying      Discipline Responsible: Psychoeducational Specialist      Signature:  Prashanth Ghosh, [address]

## 2020-04-03 NOTE — PROGRESS NOTES
Collection Time: 04/01/20  6:36 AM   Result Value Ref Range    Hemoglobin A1C 5.3 4.0 - 6.0 %    Estimated Avg Glucose 105 mg/dL   TSH without Reflex    Collection Time: 04/01/20  6:36 AM   Result Value Ref Range    TSH 0.61 0.30 - 5.00 mIU/L   T4, free    Collection Time: 04/01/20  6:36 AM   Result Value Ref Range    Thyroxine, Free 1.49 0.93 - 1.70 ng/dL   Lipid panel - fasting    Collection Time: 04/01/20  6:36 AM   Result Value Ref Range    Cholesterol 152 <200 mg/dL    HDL 53 >40 mg/dL    LDL Cholesterol 86 0 - 130 mg/dL    Chol/HDL Ratio 2.9 <5    Triglycerides 64 <150 mg/dL    VLDL NOT REPORTED 1 - 30 mg/dL       Medications  Current Facility-Administered Medications   Medication Dose Route Frequency Provider Last Rate Last Dose    sertraline (ZOLOFT) tablet 25 mg  25 mg Oral Daily Madisyn Reina APRN - CNP   25 mg at 04/03/20 0825    hydrOXYzine (ATARAX) tablet 25 mg  25 mg Oral TID PRN Anuradha Wright, APRN - CNS        acetaminophen (TYLENOL) tablet 650 mg  650 mg Oral Q4H PRN Mallika Valladares, APRN - CNS        traZODone (DESYREL) tablet 50 mg  50 mg Oral Nightly PRN Anuradha Wright, APRN - CNS   50 mg at 04/02/20 2153    benztropine mesylate (COGENTIN) injection 2 mg  2 mg Intramuscular BID PRN Anuradha Wright, APRN - CNS        magnesium hydroxide (MILK OF MAGNESIA) 400 MG/5ML suspension 30 mL  30 mL Oral Daily PRN Anuradha Wright, APRN - CNS        aluminum & magnesium hydroxide-simethicone (MAALOX) 200-200-20 MG/5ML suspension 30 mL  30 mL Oral Q6H PRN CRAIG Cavazos - CNS        loperamide (IMODIUM) capsule 2 mg  2 mg Oral 4x Daily PRN Mayela Bryn, APRN - CNP   2 mg at 03/31/20 2007    ondansetron (ZOFRAN-ODT) disintegrating tablet 4 mg  4 mg Oral Q8H PRN Mayela Bryn, APRN - CNP   4 mg at 03/31/20 2007    promethazine (PHENERGAN) injection 12.5 mg  12.5 mg Intramuscular Once Ann Chisholm, APRN - CNP             sertraline  25 mg Oral Daily    promethazine  12.5 mg Intramuscular Once

## 2020-04-03 NOTE — GROUP NOTE
Group Therapy Note    Date: 4/3/2020    Group Start Time: 1100  Group End Time: 2862  Group Topic: Psychoeducation    JIMENEZ BHIAN Westbrook, SOFÍAS    Group Therapy Note    Attendees: 8    Patient's Goal:  Pt will identify benefits of music for leisure and coping    Notes:  Pt attended group and participated    Status After Intervention:  Improved    Participation Level:  Active Listener and Interactive    Participation Quality: Appropriate, Attentive, Sharing and Supportive      Speech:  normal      Thought Process/Content: Logical  Linear      Affective Functioning: Congruent      Mood: Depressed      Level of consciousness:  Alert, Oriented x4 and Attentive      Response to Learning: Able to verbalize current knowledge/experience, Able to verbalize/acknowledge new learning, Able to retain information, Capable of insight, Able to change behavior and Progressing to goal      Endings: None Reported    Modes of Intervention: Education, Support, Socialization, Exploration, Activity and Media      Discipline Responsible: Psychoeducational Specialist      Signature:  Alecia Roblero, [address]

## 2020-04-03 NOTE — PLAN OF CARE
Problem: Suicide risk  Goal: Provide patient with safe environment  Description: Provide patient with safe environment  Outcome: Ongoing    Patient safety maintained. Problem: Depressive Behavior With or Without Suicide Precautions:  Goal: Able to verbalize acceptance of life and situations over which he or she has no control  Description: Able to verbalize acceptance of life and situations over which he or she has no control  Outcome: Ongoing    Patient is out on the unit in intervals, selectively social with peers and staff, affect bright upon approach, patient took medications as prescribed and is stating he is feeling much better. Patient stated he slept well and his appetite is good. Patient denies depression as well as any thoughts to harm self or others. Patient is soft spoken during talk time but does answers questions appropriately. Goal: Able to verbalize and/or display a decrease in depressive symptoms  Description: Able to verbalize and/or display a decrease in depressive symptoms  Outcome: Ongoing   Patient denies any depressive symptoms at this time. Goal: Ability to disclose and discuss suicidal ideas will improve  Description: Ability to disclose and discuss suicidal ideas will improve  Outcome: Ongoing   Patient denies any thoughts to harm self or others.

## 2020-04-04 VITALS
BODY MASS INDEX: 22.79 KG/M2 | SYSTOLIC BLOOD PRESSURE: 115 MMHG | HEART RATE: 82 BPM | DIASTOLIC BLOOD PRESSURE: 73 MMHG | RESPIRATION RATE: 14 BRPM | WEIGHT: 133.5 LBS | TEMPERATURE: 97.2 F | HEIGHT: 64 IN

## 2020-04-04 PROCEDURE — 6370000000 HC RX 637 (ALT 250 FOR IP): Performed by: NURSE PRACTITIONER

## 2020-04-04 PROCEDURE — 99238 HOSP IP/OBS DSCHRG MGMT 30/<: CPT | Performed by: NURSE PRACTITIONER

## 2020-04-04 RX ADMIN — SERTRALINE HYDROCHLORIDE 25 MG: 25 TABLET ORAL at 08:37

## 2020-04-04 NOTE — PLAN OF CARE
Problem: Suicide risk  Goal: Provide patient with safe environment  Description: Provide patient with safe environment  4/4/2020 0841 by Sebastian Davis RN  Outcome: Completed  4/3/2020 2134 by Ari Oviedo RN  Outcome: Met This Shift  Note: Patient remained safe and free from harm        Problem: Depressive Behavior With or Without Suicide Precautions:  Goal: Able to verbalize acceptance of life and situations over which he or she has no control  Description: Able to verbalize acceptance of life and situations over which he or she has no control  4/4/2020 0841 by Sebastian Davis RN  Outcome: Completed  4/3/2020 2134 by Ari Oviedo RN  Outcome: Ongoing  Note: Patient remains bright with a good outlook. States he is ready and looking forward to discharge tomorrow. Goal: Able to verbalize and/or display a decrease in depressive symptoms  Description: Able to verbalize and/or display a decrease in depressive symptoms  Outcome: Completed  Goal: Ability to disclose and discuss suicidal ideas will improve  Description: Ability to disclose and discuss suicidal ideas will improve  4/4/2020 0841 by Sebastian Davis RN  Outcome: Completed  4/3/2020 2134 by Ari Oviedo RN  Outcome: Ongoing  Note: Patient reports feeling much better, he is out social with peers. He denies suicidal ideations.

## 2020-04-04 NOTE — DISCHARGE SUMMARY
therapies. Meds were adjusted. After few days of hospital care, patient began to feel improvement. Depression lifted, thoughts to harm self ceased. Sleep improved, appetite was good. On morning rounds 4/4/2020, patient endorsed feeling ready for discharge. Patient denies suicidal or homicidal ideations, denies hallucinations or delusions. Denies SE's from meds. It was decided that pt had achieved maximum benefit from hospital care and can be discharged     Consults: None    Significant Diagnostic Studies: Routine labs and diagnostics    Treatments: Psychotropic medications, therapy with group, milieu, and 1:1 with nurses, social workers, PA-C/CNP, and Attending physician. Discharge Medications:  Current Discharge Medication List      START taking these medications    Details   sertraline (ZOLOFT) 25 MG tablet Take 1 tablet by mouth daily  Qty: 30 tablet, Refills: 0      traZODone (DESYREL) 50 MG tablet Take 1 tablet by mouth nightly as needed for Sleep  Qty: 30 tablet, Refills: 0         STOP taking these medications       diphenhydrAMINE (BENADRYL) 25 MG tablet Comments:   Reason for Stopping:              Core Measures statement:   Not applicable    Discharge Exam:  Level of consciousness:  Within normal limits  Appearance: Street clothes, seated, with fair grooming  Behavior/Motor: No abnormalities noted  Attitude toward examiner:  Cooperative, attentive, good eye contact  Speech:  spontaneous, normal rate, normal volume and well articulated  Mood:  euthymic  Affect:  mood congruent  Thought processes:  linear, goal directed and coherent  Thought content:  Homocidal ideation denies  Suicidal Ideation:  denies suicidal ideation  Delusions:  no evidence of delusions  Perceptual Disturbance:  denies any perceptual disturbance  Cognition:  In tact  Memory: age appropriate  Insight & Judgement: fair  Medication side effects:  denies     Disposition: home    Patient Instructions:    Activity: activity as

## 2020-04-04 NOTE — GROUP NOTE
Group Therapy Note    Date: 4/4/2020    Group Start Time: 0900  Group End Time: 0920  Group Topic: Community Meeting    SAMEERA Christy        Group Therapy Note    Attendees: 11         Pt attended 1:1 goal setting and developed goal of being discharged today.  Pt will take meds and follow up as goal for discharge      Signature:  Yuri Callejas

## 2020-06-15 ENCOUNTER — HOSPITAL ENCOUNTER (EMERGENCY)
Age: 20
Discharge: ANOTHER HOSPITAL | End: 2020-06-16
Attending: FAMILY MEDICINE
Payer: COMMERCIAL

## 2020-06-15 LAB
ABSOLUTE EOS #: 0 K/UL (ref 0–0.4)
ABSOLUTE IMMATURE GRANULOCYTE: NORMAL K/UL (ref 0–0.3)
ABSOLUTE LYMPH #: 1.8 K/UL (ref 1.2–5.2)
ABSOLUTE MONO #: 0.4 K/UL (ref 0–1)
ACETAMINOPHEN LEVEL: <5 UG/ML (ref 10–30)
AMPHETAMINE SCREEN URINE: NEGATIVE
ANION GAP SERPL CALCULATED.3IONS-SCNC: 18 MMOL/L (ref 9–17)
BARBITURATE SCREEN URINE: NEGATIVE
BASOPHILS # BLD: 1 % (ref 0–2)
BASOPHILS ABSOLUTE: 0 K/UL (ref 0–0.2)
BENZODIAZEPINE SCREEN, URINE: NEGATIVE
BUN BLDV-MCNC: 9 MG/DL (ref 6–20)
BUN/CREAT BLD: 13 (ref 9–20)
BUPRENORPHINE URINE: ABNORMAL
CALCIUM SERPL-MCNC: 10.6 MG/DL (ref 8.6–10.4)
CANNABINOID SCREEN URINE: POSITIVE
CHLORIDE BLD-SCNC: 96 MMOL/L (ref 98–107)
CO2: 19 MMOL/L (ref 20–31)
COCAINE METABOLITE, URINE: NEGATIVE
CREAT SERPL-MCNC: 0.71 MG/DL (ref 0.7–1.2)
DIFFERENTIAL TYPE: YES
EOSINOPHILS RELATIVE PERCENT: 1 % (ref 0–5)
ETHANOL PERCENT: <0.01 %
ETHANOL: <10 MG/DL
GFR AFRICAN AMERICAN: ABNORMAL ML/MIN
GFR NON-AFRICAN AMERICAN: ABNORMAL ML/MIN
GFR SERPL CREATININE-BSD FRML MDRD: ABNORMAL ML/MIN/{1.73_M2}
GFR SERPL CREATININE-BSD FRML MDRD: ABNORMAL ML/MIN/{1.73_M2}
GLUCOSE BLD-MCNC: 113 MG/DL (ref 70–99)
HCT VFR BLD CALC: 47.9 % (ref 41–53)
HEMOGLOBIN: 16 G/DL (ref 13.5–17.5)
IMMATURE GRANULOCYTES: NORMAL %
LYMPHOCYTES # BLD: 25 % (ref 13–44)
MCH RBC QN AUTO: 29.4 PG (ref 26–34)
MCHC RBC AUTO-ENTMCNC: 33.5 G/DL (ref 31–37)
MCV RBC AUTO: 87.6 FL (ref 80–100)
MDMA URINE: ABNORMAL
METHADONE SCREEN, URINE: NEGATIVE
METHAMPHETAMINE, URINE: NEGATIVE
MONOCYTES # BLD: 5 % (ref 5–9)
NRBC AUTOMATED: NORMAL PER 100 WBC
OPIATES, URINE: NEGATIVE
OXYCODONE SCREEN URINE: NEGATIVE
PDW BLD-RTO: 13.2 % (ref 12.1–15.2)
PHENCYCLIDINE, URINE: NEGATIVE
PLATELET # BLD: 183 K/UL (ref 140–450)
PLATELET ESTIMATE: NORMAL
PMV BLD AUTO: NORMAL FL (ref 6–12)
POTASSIUM SERPL-SCNC: 3.3 MMOL/L (ref 3.7–5.3)
PROPOXYPHENE, URINE: NEGATIVE
RBC # BLD: 5.47 M/UL (ref 4.5–5.9)
RBC # BLD: NORMAL 10*6/UL
SALICYLATE LEVEL: <1 MG/DL (ref 3–10)
SARS-COV-2, PCR: NORMAL
SARS-COV-2, RAPID: NOT DETECTED
SARS-COV-2: NORMAL
SEG NEUTROPHILS: 68 % (ref 39–75)
SEGMENTED NEUTROPHILS ABSOLUTE COUNT: 5.1 K/UL (ref 2.1–6.5)
SODIUM BLD-SCNC: 133 MMOL/L (ref 135–144)
SOURCE: NORMAL
TEST INFORMATION: ABNORMAL
TRICYCLIC ANTIDEPRESSANTS, UR: NEGATIVE
WBC # BLD: 7.4 K/UL (ref 4.5–13.5)
WBC # BLD: NORMAL 10*3/UL

## 2020-06-15 PROCEDURE — 85025 COMPLETE CBC W/AUTO DIFF WBC: CPT

## 2020-06-15 PROCEDURE — 80048 BASIC METABOLIC PNL TOTAL CA: CPT

## 2020-06-15 PROCEDURE — U0002 COVID-19 LAB TEST NON-CDC: HCPCS

## 2020-06-15 PROCEDURE — 6370000000 HC RX 637 (ALT 250 FOR IP): Performed by: FAMILY MEDICINE

## 2020-06-15 PROCEDURE — 80306 DRUG TEST PRSMV INSTRMNT: CPT

## 2020-06-15 PROCEDURE — 80307 DRUG TEST PRSMV CHEM ANLYZR: CPT

## 2020-06-15 PROCEDURE — 99291 CRITICAL CARE FIRST HOUR: CPT

## 2020-06-15 PROCEDURE — G0480 DRUG TEST DEF 1-7 CLASSES: HCPCS

## 2020-06-15 PROCEDURE — 93005 ELECTROCARDIOGRAM TRACING: CPT | Performed by: FAMILY MEDICINE

## 2020-06-15 RX ORDER — POTASSIUM CHLORIDE 750 MG/1
20 TABLET, FILM COATED, EXTENDED RELEASE ORAL ONCE
Status: COMPLETED | OUTPATIENT
Start: 2020-06-15 | End: 2020-06-15

## 2020-06-15 RX ORDER — ONDANSETRON 4 MG/1
4 TABLET, ORALLY DISINTEGRATING ORAL ONCE
Status: COMPLETED | OUTPATIENT
Start: 2020-06-15 | End: 2020-06-15

## 2020-06-15 RX ADMIN — POTASSIUM CHLORIDE 20 MEQ: 750 TABLET, FILM COATED, EXTENDED RELEASE ORAL at 23:40

## 2020-06-15 RX ADMIN — ONDANSETRON 4 MG: 4 TABLET, ORALLY DISINTEGRATING ORAL at 23:35

## 2020-06-15 ASSESSMENT — PATIENT HEALTH QUESTIONNAIRE - PHQ9: SUM OF ALL RESPONSES TO PHQ QUESTIONS 1-9: 0

## 2020-06-15 ASSESSMENT — ENCOUNTER SYMPTOMS
VOMITING: 0
ABDOMINAL PAIN: 0
NAUSEA: 0

## 2020-06-16 ENCOUNTER — HOSPITAL ENCOUNTER (INPATIENT)
Age: 20
LOS: 2 days | Discharge: HOME | DRG: 885 | End: 2020-06-18
Attending: PSYCHIATRY & NEUROLOGY | Admitting: PSYCHIATRY & NEUROLOGY
Payer: COMMERCIAL

## 2020-06-16 VITALS
HEIGHT: 69 IN | BODY MASS INDEX: 19.85 KG/M2 | HEART RATE: 73 BPM | OXYGEN SATURATION: 98 % | RESPIRATION RATE: 15 BRPM | SYSTOLIC BLOOD PRESSURE: 101 MMHG | WEIGHT: 134 LBS | DIASTOLIC BLOOD PRESSURE: 44 MMHG | TEMPERATURE: 99.1 F

## 2020-06-16 PROBLEM — F33.9 MAJOR DEPRESSIVE DISORDER, RECURRENT (HCC): Status: ACTIVE | Noted: 2020-06-16

## 2020-06-16 LAB
EKG ATRIAL RATE: 82 BPM
EKG P AXIS: 69 DEGREES
EKG P-R INTERVAL: 144 MS
EKG Q-T INTERVAL: 376 MS
EKG QRS DURATION: 78 MS
EKG QTC CALCULATION (BAZETT): 439 MS
EKG R AXIS: 39 DEGREES
EKG T AXIS: 33 DEGREES
EKG VENTRICULAR RATE: 82 BPM

## 2020-06-16 PROCEDURE — 1240000000 HC EMOTIONAL WELLNESS R&B

## 2020-06-16 PROCEDURE — 6370000000 HC RX 637 (ALT 250 FOR IP): Performed by: PSYCHIATRY & NEUROLOGY

## 2020-06-16 PROCEDURE — 93010 ELECTROCARDIOGRAM REPORT: CPT | Performed by: INTERNAL MEDICINE

## 2020-06-16 PROCEDURE — 99223 1ST HOSP IP/OBS HIGH 75: CPT | Performed by: PSYCHIATRY & NEUROLOGY

## 2020-06-16 RX ORDER — HALOPERIDOL 5 MG/ML
5 INJECTION INTRAMUSCULAR EVERY 4 HOURS PRN
Status: DISCONTINUED | OUTPATIENT
Start: 2020-06-16 | End: 2020-06-18 | Stop reason: HOSPADM

## 2020-06-16 RX ORDER — QUETIAPINE FUMARATE 50 MG/1
50 TABLET, FILM COATED ORAL NIGHTLY
Status: DISCONTINUED | OUTPATIENT
Start: 2020-06-16 | End: 2020-06-18 | Stop reason: HOSPADM

## 2020-06-16 RX ORDER — HALOPERIDOL 10 MG/1
5 TABLET ORAL EVERY 4 HOURS PRN
Status: DISCONTINUED | OUTPATIENT
Start: 2020-06-16 | End: 2020-06-18 | Stop reason: HOSPADM

## 2020-06-16 RX ORDER — HYDROXYZINE 50 MG/1
50 TABLET, FILM COATED ORAL 3 TIMES DAILY PRN
Status: DISCONTINUED | OUTPATIENT
Start: 2020-06-16 | End: 2020-06-18 | Stop reason: HOSPADM

## 2020-06-16 RX ADMIN — QUETIAPINE FUMARATE 50 MG: 50 TABLET ORAL at 23:01

## 2020-06-16 RX ADMIN — SERTRALINE HYDROCHLORIDE 50 MG: 50 TABLET ORAL at 16:59

## 2020-06-16 ASSESSMENT — SLEEP AND FATIGUE QUESTIONNAIRES
DIFFICULTY ARISING: NO
RESTFUL SLEEP: NO
DIFFICULTY STAYING ASLEEP: YES
DO YOU USE A SLEEP AID: YES
DIFFICULTY FALLING ASLEEP: YES
AVERAGE NUMBER OF SLEEP HOURS: 6
DO YOU HAVE DIFFICULTY SLEEPING: YES
SLEEP PATTERN: DIFFICULTY FALLING ASLEEP

## 2020-06-16 ASSESSMENT — LIFESTYLE VARIABLES
HISTORY_ALCOHOL_USE: NO
HISTORY_ALCOHOL_USE: NO

## 2020-06-16 ASSESSMENT — PAIN SCALES - GENERAL: PAINLEVEL_OUTOF10: 0

## 2020-06-16 ASSESSMENT — PATIENT HEALTH QUESTIONNAIRE - PHQ9: SUM OF ALL RESPONSES TO PHQ QUESTIONS 1-9: 1

## 2020-06-16 NOTE — CARE COORDINATION
B[facility] Biopsychosocial Assessment    Current Level of Psychosocial Functioning     Independent   Dependent    Minimal Assist X       Psychosocial High Risk Factors (check all that apply)    Unable to obtain meds   Chronic illness/pain    Substance abuse X Marijuana   Lack of Family Support X  Financial stress X  Isolation X  Inadequate Community Resources  Suicide attempt(s) X  Not taking medications X  Victim of crime   Developmental Delay  Unable to manage personal needs  X  Age 72 or older   Homeless  No transportation   Readmission within 30 days  Unemployment  Traumatic Event    Psychiatric Advanced Directives: none reported     Family to Involve in Treatment: PT reports a lack of family support     Sexual Orientation:  GRUPO    Patient Strengths: linked with [facility] for Outpatient Treatment, insurance, adequate housing    Patient Barriers:  No current income, presents on admission following suicide attempt, Marijuana use, lack of family support     Opiate Education Provided: N/A PT denies and does not have a documented history of Opiate or Heroin use/abuse. CMHC/mental health history: PT is currently linked with [facility] in [place] for Outpatient Treatment. He reports that his Psychiatrist is Dr. Jesse Reyes and he also sees a therapist.     Plan of Care   medication management, group/individual therapies, family meetings, psycho -education, treatment team meetings to assist with stabilization    Initial Discharge Plan:  PT reports a plan to return to live with his parents in their home in [place], [place] following discharge PT also reports a plan to continue following up with Outpatient Treatment at [facility] in [place], [place] following discharge. Clinical Summary:  Shaun Granger is a 23 y.o. male who presents on admission following suicide attempt. Patient reports that he took 20-30 trazodone 50 mg tablets in a suicide attempt.    Patient does have a history of suicide attempts in the past. PT reports that

## 2020-06-16 NOTE — GROUP NOTE
Group Therapy Note    Date: 6/16/2020    Group Start Time: 1600  Group End Time: 1518  Group Topic: Group Documentation    GUADALUPE HOFFMAN    Calixto Tirado        Group Therapy Note    Attendees: 9/20         Patient's Goal: Work on focus and participation    Notes:  Green Behavior Scale - Life Enhancing Areas    Status After Intervention:  Improved    Participation Level:  Active Listener and Interactive    Participation Quality: Appropriate, Attentive, Sharing and Supportive      Speech:  normal      Thought Process/Content: Logical      Affective Functioning: Congruent      Mood: anxious      Level of consciousness:  Alert, Oriented x4 and Attentive      Response to Learning: Able to verbalize current knowledge/experience, Able to verbalize/acknowledge new learning, Able to retain information and Capable of insight      Endings: None Reported    Modes of Intervention: Education, Support, Socialization, Exploration and Problem-solving      Discipline Responsible: [facility] [address]      Signature:  Calixto Tirado

## 2020-06-17 PROBLEM — T43.211A OVERDOSE OF TRAZODONE: Status: ACTIVE | Noted: 2020-06-17

## 2020-06-17 LAB
ALBUMIN SERPL-MCNC: 4.2 G/DL (ref 3.5–5.2)
ALBUMIN/GLOBULIN RATIO: ABNORMAL (ref 1–2.5)
ALP BLD-CCNC: 71 U/L (ref 40–129)
ALT SERPL-CCNC: 8 U/L (ref 5–41)
ANION GAP SERPL CALCULATED.3IONS-SCNC: 13 MMOL/L (ref 9–17)
AST SERPL-CCNC: 9 U/L
BILIRUB SERPL-MCNC: 0.4 MG/DL (ref 0.3–1.2)
BUN BLDV-MCNC: 8 MG/DL (ref 6–20)
BUN/CREAT BLD: ABNORMAL (ref 9–20)
CALCIUM SERPL-MCNC: 9.4 MG/DL (ref 8.6–10.4)
CHLORIDE BLD-SCNC: 107 MMOL/L (ref 98–107)
CO2: 25 MMOL/L (ref 20–31)
CREAT SERPL-MCNC: 0.53 MG/DL (ref 0.7–1.2)
GFR AFRICAN AMERICAN: ABNORMAL ML/MIN
GFR NON-AFRICAN AMERICAN: ABNORMAL ML/MIN
GFR SERPL CREATININE-BSD FRML MDRD: ABNORMAL ML/MIN/{1.73_M2}
GFR SERPL CREATININE-BSD FRML MDRD: ABNORMAL ML/MIN/{1.73_M2}
GLUCOSE BLD-MCNC: 98 MG/DL (ref 70–99)
HCT VFR BLD CALC: 42.2 % (ref 41–53)
HEMOGLOBIN: 14.4 G/DL (ref 13.5–17.5)
MCH RBC QN AUTO: 30.2 PG (ref 26–34)
MCHC RBC AUTO-ENTMCNC: 34.1 G/DL (ref 31–37)
MCV RBC AUTO: 88.5 FL (ref 80–100)
NRBC AUTOMATED: NORMAL PER 100 WBC
PDW BLD-RTO: 13.4 % (ref 11.5–14.9)
PLATELET # BLD: 166 K/UL (ref 150–450)
PMV BLD AUTO: 8.8 FL (ref 6–12)
POTASSIUM SERPL-SCNC: 3.9 MMOL/L (ref 3.7–5.3)
RBC # BLD: 4.77 M/UL (ref 4.5–5.9)
SODIUM BLD-SCNC: 145 MMOL/L (ref 135–144)
TOTAL PROTEIN: 6.9 G/DL (ref 6.4–8.3)
WBC # BLD: 5.9 K/UL (ref 4.5–13.5)

## 2020-06-17 PROCEDURE — 90833 PSYTX W PT W E/M 30 MIN: CPT | Performed by: PSYCHIATRY & NEUROLOGY

## 2020-06-17 PROCEDURE — 80053 COMPREHEN METABOLIC PANEL: CPT

## 2020-06-17 PROCEDURE — 6370000000 HC RX 637 (ALT 250 FOR IP): Performed by: PSYCHIATRY & NEUROLOGY

## 2020-06-17 PROCEDURE — 36415 COLL VENOUS BLD VENIPUNCTURE: CPT

## 2020-06-17 PROCEDURE — 85027 COMPLETE CBC AUTOMATED: CPT

## 2020-06-17 PROCEDURE — 99232 SBSQ HOSP IP/OBS MODERATE 35: CPT | Performed by: PSYCHIATRY & NEUROLOGY

## 2020-06-17 PROCEDURE — 99253 IP/OBS CNSLTJ NEW/EST LOW 45: CPT | Performed by: INTERNAL MEDICINE

## 2020-06-17 PROCEDURE — 1240000000 HC EMOTIONAL WELLNESS R&B

## 2020-06-17 RX ADMIN — QUETIAPINE FUMARATE 50 MG: 50 TABLET ORAL at 22:33

## 2020-06-17 RX ADMIN — SERTRALINE HYDROCHLORIDE 50 MG: 50 TABLET ORAL at 09:35

## 2020-06-17 NOTE — FLOWSHEET NOTE
*Patient participated in the [facility]50 [facility] Service       06/17/20 1404   Encounter Summary   Services provided to: Patient   Referral/Consult From: Rounding   Continue Visiting   (6/17/20)   Complexity of Encounter Low   Length of Encounter 30 minutes   Spiritual Assessment Completed Yes   Spiritual/Restorationism   Type Spiritual support   Assessment Calm; Approachable   Intervention Active listening;Prayer;Sustaining presence/ Ministry of presence   Outcome Receptive;Engaged in conversation

## 2020-06-17 NOTE — PROGRESS NOTES
[facility] FOLLOW-UP NOTE     6/17/2020     Patient was seen and examined in person, Chart reviewed   Patient's case discussed with staff/team    Chief Complaint: Suicidal ideation  Interim History:     -Medication seemed to help. Patient has spoken to his mother several times. She was supportive. -Expresses some remorse for suicide attempt. Says it was impulsive.  -Says he has been working on a safety plan with . He has found group therapy beneficial.    Appetite:   [x] Normal/Unchanged  [] Increased  [] Decreased      Sleep:       [] Normal/Unchanged  [x] Fair       [] Poor              Energy:    [] Normal/Unchanged  [] Increased  [x] Decreased        Aggression:  [] yes  [x] no    Patient is [x] able  [] unable to CONTRACT FOR SAFETY ON THE UNIT    PAST MEDICAL/PSYCHIATRIC HISTORY:   Past Medical History:   Diagnosis Date    Attempted suicide ([facility]     Depression     Psychiatric problem        FAMILY/SOCIAL HISTORY:  No family history on file.   Social History     Socioeconomic History    Marital status: Single     Spouse name: Not on file    Number of children: Not on file    Years of education: Not on file    Highest education level: Not on file   Occupational History    Not on file   Social Needs    Financial resource strain: Not on file    Food insecurity     Worry: Not on file     Inability: Not on file    Transportation needs     Medical: Not on file     Non-medical: Not on file   Tobacco Use    Smoking status: Never Smoker    Smokeless tobacco: Never Used   Substance and Sexual Activity    Alcohol use: No    Drug use: Yes     Types: Marijuana    Sexual activity: Not Currently     Partners: Male   Lifestyle    Physical activity     Days per week: Not on file     Minutes per session: Not on file    Stress: Not on file   Relationships    Social connections     Talks on phone: Not on file     Gets together: Not on file     Attends Druze service: Not on file     Active member of

## 2020-06-18 VITALS
SYSTOLIC BLOOD PRESSURE: 118 MMHG | RESPIRATION RATE: 14 BRPM | HEART RATE: 55 BPM | DIASTOLIC BLOOD PRESSURE: 74 MMHG | HEIGHT: 69 IN | BODY MASS INDEX: 19.85 KG/M2 | TEMPERATURE: 97.3 F | WEIGHT: 134 LBS

## 2020-06-18 PROCEDURE — 6370000000 HC RX 637 (ALT 250 FOR IP): Performed by: PSYCHIATRY & NEUROLOGY

## 2020-06-18 PROCEDURE — 99239 HOSP IP/OBS DSCHRG MGMT >30: CPT | Performed by: PSYCHIATRY & NEUROLOGY

## 2020-06-18 RX ORDER — QUETIAPINE FUMARATE 50 MG/1
50 TABLET, FILM COATED ORAL NIGHTLY
Qty: 30 TABLET | Refills: 3 | Status: SHIPPED | OUTPATIENT
Start: 2020-06-18

## 2020-06-18 RX ADMIN — SERTRALINE HYDROCHLORIDE 50 MG: 50 TABLET ORAL at 09:31

## 2020-06-18 NOTE — PROGRESS NOTES
CLINICAL PHARMACY NOTE: MEDS TO [address] [facility] Patient?: No  Total # of Prescriptions Filled: 2   The following medications were delivered to the patient:  · Sertraline  · Quetiapine  ·   Total # of Interventions Completed: 0  Time Spent (min): 30    Additional Documentation:

## 2020-06-18 NOTE — GROUP NOTE
Group Therapy Note    Date: 6/18/2020    Group Start Time: 7199  Group End Time: 5200  Group Topic: Cognitive Skills    JIMENEZ BHSOFÍA DaleS        Group Therapy Note    Attendees: 5         Patient's Goal:  To improve coping skills     Notes:   Pt was pleasant and cooperative     Status After Intervention:  Improved    Participation Level:  Active Listener and Interactive    Participation Quality: Appropriate and Attentive      Speech:  normal      Thought Process/Content: Logical      Affective Functioning: Congruent      Mood: euthymic      Level of consciousness:  alert      Response to Learning: Able to verbalize current knowledge/experience and Progressing to goal      Endings: None Reported    Modes of Intervention: Education and Support      Discipline Responsible: Psychoeducational Specialist      Signature:  Bri Ewing

## 2020-06-18 NOTE — DISCHARGE SUMMARY
DISCHARGE SUMMARY      Patient ID:  Merilee Baumgarten  [id number]  23 y.o.  2000    Admit date: 6/16/2020    Discharge date and time: 6/18/2020    Disposition: Home   Admitting Physician: Allan Crawley MD     Discharge Physician: Dr Luca Chen MD    Admission Diagnoses: Major depressive disorder, recurrent ([facility] [F33.9]    Admission Condition: poor    Discharged Condition: stable    Admission Circumstance: The patient was admitted to psychiatry after presenting with an overdose of trazodone tablets. This was triggered by an argument with his parents. PAST MEDICAL/PSYCHIATRIC HISTORY:   Past Medical History:   Diagnosis Date    Attempted suicide ([facility]     Depression     Psychiatric problem        FAMILY/SOCIAL HISTORY:  No family history on file.   Social History     Socioeconomic History    Marital status: Single     Spouse name: Not on file    Number of children: Not on file    Years of education: Not on file    Highest education level: Not on file   Occupational History    Not on file   Social Needs    Financial resource strain: Not on file    Food insecurity     Worry: Not on file     Inability: Not on file    Transportation needs     Medical: Not on file     Non-medical: Not on file   Tobacco Use    Smoking status: Never Smoker    Smokeless tobacco: Never Used   Substance and Sexual Activity    Alcohol use: No    Drug use: Yes     Types: Marijuana    Sexual activity: Not Currently     Partners: Male   Lifestyle    Physical activity     Days per week: Not on file     Minutes per session: Not on file    Stress: Not on file   Relationships    Social connections     Talks on phone: Not on file     Gets together: Not on file     Attends Religion service: Not on file     Active member of club or organization: Not on file     Attends meetings of clubs or organizations: Not on file     Relationship status: Not on file    Intimate partner violence     Fear of current or ex partner: Not on file     Emotionally abused: Not on file     Physically abused: Not on file     Forced sexual activity: Not on file   Other Topics Concern    Not on file   Social History Narrative    Not on file       MEDICATIONS:    Current Facility-Administered Medications:     QUEtiapine (SEROQUEL) tablet 50 mg, 50 mg, Oral, Nightly, Deborah Medina MD, 50 mg at 06/17/20 2233    sertraline (ZOLOFT) tablet 50 mg, 50 mg, Oral, Daily, Deborah Medina MD, 50 mg at 06/18/20 0931    hydrOXYzine (ATARAX) tablet 50 mg, 50 mg, Oral, TID PRN, Deborah Medina MD    haloperidol lactate (HALDOL) injection 5 mg, 5 mg, Intramuscular, Q4H PRN **OR** haloperidol (HALDOL) tablet 5 mg, 5 mg, Oral, Q4H PRN, Deborah Medina MD    Examination:  /74   Pulse 55   Temp 97.3 °F (36.3 °C) (Oral)   Resp 14   Ht 5' 9\" (1.753 m)   Wt 134 lb (60.8 kg)   BMI 19.79 kg/m²   Gait - steady    HOSPITAL COURSE[de-identified]  Following admission to the hospital, patient had a complete physical exam and blood work up, which was unremarkable. Patient was monitored closely with suicide precaution  Patient was started on Zoloft 50 mg daily and Seroquel 50 mg at night  Was encouraged to participate in group and other milieu activity  Patient started to feel better with this combination of treatment. Significant progress in the symptoms since admission. Mood is improved  The patient denies AVH or paranoid thoughts  The patient denies any hopelessness or worthlessness  No active SI/HI  Appetite:  [x] Normal  [] Increased  [] Decreased    Sleep:       [x] Normal  [] Fair       [] Poor            Energy:    [x] Normal  [] Increased  [] Decreased     SI [] Present  [x] Absent  HI  []Present  [x] Absent   Aggression:  [] yes  [] no  Patient is [x] able  [] unable to CONTRACT FOR SAFETY   Medication side effects(SE):  [x] None(Psych.  Meds.) [] Other      Mental Status Examination on discharge:    Level of consciousness:  within normal limits   Appearance: nearest ED or call 911 if symptoms are not manageable. Medication List      START taking these medications    QUEtiapine 50 MG tablet  Commonly known as:  SEROQUEL  Take 1 tablet by mouth nightly        CHANGE how you take these medications    sertraline 50 MG tablet  Commonly known as:  ZOLOFT  Take 1 tablet by mouth daily  Start taking on:  June 19, 2020  What changed:    · medication strength  · how much to take        STOP taking these medications    traZODone 50 MG tablet  Commonly known as:  DESYREL           Where to Get Your Medications      These medications were sent to [facility], [place] [address]    Phone:  [phone]   · QUEtiapine 50 MG tablet  · sertraline 50 MG tablet           TIME SPENT - 35 MINUTES TO COMPLETE THE EVALUATION, DISCHARGE SUMMARY, MEDICATION RECONCILIATION AND FOLLOW UP Yonny Coyne is a 23 y.o. male being evaluated by a Virtual Visit (video visit) encounter to address concerns as mentioned above. A caregiver was present in the room along with the patient. Pursuant to the emergency declaration under the [address] [place] [address], [address] [place] [address] authority and the [employer] Act, this Virtual Visit was conducted with patient's (and/or legal guardian's) consent, to reduce the patient's risk of exposure to COVID-19 and provide necessary medical care. Services were provided through a video synchronous discussion virtually to substitute for in-person visit by provider. Patient is present at [facility]  and I am physically present at my home in [place] [address], [place] on 6/18/2020 at 11:57 AM    An electronic signature was used to authenticate this note. **This report has been created using voice recognition software. It may contain minor errors which are inherent in voice recognition technology. **

## 2020-06-18 NOTE — GROUP NOTE
Group Therapy Note    Date: 6/18/2020    Group Start Time: 0900  Group End Time: 0930  Group Topic: Community Meeting    JIMENEZ Henao, [address]         Group Therapy Note    Attendees: 8/16          Patient's Goal:  To increase social interaction and to explore and identify short term goals r/t wellness and recovery. RT discussed group schedule and unit structure/resources and encouraged pts to be engaged in their treatment. Pts were given opportunities to ask questions. Notes: Pt participated in group task and was able to identify short term goals r/t wellness and recovery. Pt is pleasant and supportive of peers      Status After Intervention:  Improved     Participation Level:  Active Listener and Interactive     Participation Quality: Appropriate, Attentive, Sharing       Speech:   Normal     Thought Process/Content: Logical,linear     Affective Functioning: Congruent   Mood: euthymic      Level of consciousness:  Alert, and Attentive     Response to Learning: Able to verbalize current knowledge/experience, Able to verbalize/acknowledge new learning, and Progressing to goal   Endings: None Reported       Modes of Intervention: Education, Support, Socialization, Exploration, Clarifying and Problem-solving      Discipline Responsible: Psychoeducational Specialist     Signature:  Marco Grullon

## 2020-06-18 NOTE — CONSULTS
[place] [address] [facility]    CONSULTATION / HISTORY AND PHYSICAL EXAMINATION            Date:   6/17/2020  Patient name:  Arcelia Villaseñor  Date of admission:  6/16/2020  5:00 AM  MRN:   [id number]  Account:  [de-identified]  YOB: 2000  PCP:    LEONEL Verde  Room:   0223/0223-01  Code Status:    Full Code    Physician Requesting Consult: Deborah Medina MD    Reason for Consult:  Trazodone overdose     Chief Complaint:     No chief complaint on file. History Obtained From:     patient, electronic medical record    History of Present Illness:   Patient admitted to [facility] unit, with bipolar disorder, major depression  Internal medicine, consulted with patient history of trazodone overdose  Patient was in the emergency room, around 15 Tanya, with trazodone overdose. Patient has taken almost 20 to 30 pills of 50 mg of trazodone. Her EKG was normal, poison control was consulted  Patient lab work, was normal, EKG was normal    Past Medical History:     Past Medical History:   Diagnosis Date    Attempted suicide ([facility] [address]     Depression     Psychiatric problem         Past Surgical History:     Past Surgical History:   Procedure Laterality Date    ARM SURGERY Left     for fracture        Medications Prior to Admission:     Prior to Admission medications    Medication Sig Start Date End Date Taking? Authorizing Provider   sertraline (ZOLOFT) 25 MG tablet Take 1 tablet by mouth daily 4/4/20  Yes CRAIG Montenegro CNP   traZODone (DESYREL) 50 MG tablet Take 1 tablet by mouth nightly as needed for Sleep 4/3/20  Yes CRAIG Montenegro CNP        Allergies:     Patient has no known allergies. Social History:     Tobacco:    reports that he has never smoked. He has never used smokeless tobacco.  Alcohol:      reports no history of alcohol use. Drug Use:  reports current drug use. Drug: Marijuana.     Family History:     No family history on file.    Review of Systems:     Positive and Negative as described in HPI. CONSTITUTIONAL:  negative for fevers, chills, sweats, fatigue, weight loss  HEENT:  negative for vision, hearing changes, runny nose, throat pain  RESPIRATORY:  negative for shortness of breath, cough, congestion, wheezing. CARDIOVASCULAR:  negative for chest pain, palpitations. GASTROINTESTINAL:  negative for nausea, vomiting, diarrhea, constipation, change in bowel habits, abdominal pain   GENITOURINARY:  negative for difficulty of urination, burning with urination, frequency   INTEGUMENT:  negative for rash, skin lesions, easy bruising   HEMATOLOGIC/LYMPHATIC:  negative for swelling/edema   ALLERGIC/IMMUNOLOGIC:  negative for urticaria , itching  ENDOCRINE:  negative increase in drinking, increase in urination, hot or cold intolerance  MUSCULOSKELETAL:  negative joint pains, muscle aches, swelling of joints  NEUROLOGICAL:  negative for headaches, dizziness, lightheadedness, numbness, pain, tingling extremities  BEHAVIOR/PSYCH:  Positive for Depression     Physical Exam:     /65   Pulse 84   Temp 98.2 °F (36.8 °C)   Resp 14   Ht 5' 9\" (1.753 m)   Wt 134 lb (60.8 kg)   BMI 19.79 kg/m²   Temp (24hrs), Av.8 °F (36.6 °C), Min:97.4 °F (36.3 °C), Max:98.2 °F (36.8 °C)    No results for input(s): POCGLU in the last 72 hours. No intake or output data in the 24 hours ending 20 [id number]    General Appearance:  alert, well appearing, and in no acute distress  Mental status: oriented to person, place, and time with normal affect  Head:  normocephalic, atraumatic.   Eye: no icterus, redness, pupils equal and reactive, extraocular eye movements intact, conjunctiva clear  Ear: normal external ear, no discharge, hearing intact  Nose:  no drainage noted  Mouth: mucous membranes moist  Neck: supple, no carotid bruits, thyroid not palpable  Lungs: Bilateral equal air entry, clear to ausculation, no wheezing, rales or rhonchi, normal effort  Cardiovascular: normal rate, regular rhythm, no murmur, gallop, rub. Abdomen: Soft, nontender, nondistended, normal bowel sounds, no hepatomegaly or splenomegaly  Neurologic: There are no new focal motor or sensory deficits, normal muscle tone and bulk, no abnormal sensation, normal speech, cranial nerves II through XII grossly intact  Skin: No gross lesions, rashes, bruising or bleeding on exposed skin area  Extremities:  peripheral pulses palpable, no pedal edema or calf pain with palpation  Psych: normal affect    Investigations:      Laboratory Testing:  Recent Results (from the past 24 hour(s))   Comprehensive Metabolic Panel w/ Reflex to MG    Collection Time: 06/17/20  6:52 AM   Result Value Ref Range    Glucose 98 70 - 99 mg/dL    BUN 8 6 - 20 mg/dL    CREATININE 0.53 (L) 0.70 - 1.20 mg/dL    Bun/Cre Ratio NOT REPORTED 9 - 20    Calcium 9.4 8.6 - 10.4 mg/dL    Sodium 145 (H) 135 - 144 mmol/L    Potassium 3.9 3.7 - 5.3 mmol/L    Chloride 107 98 - 107 mmol/L    CO2 25 20 - 31 mmol/L    Anion Gap 13 9 - 17 mmol/L    Alkaline Phosphatase 71 40 - 129 U/L    ALT 8 5 - 41 U/L    AST 9 <40 U/L    Total Bilirubin 0.40 0.3 - 1.2 mg/dL    Total Protein 6.9 6.4 - 8.3 g/dL    Alb 4.2 3.5 - 5.2 g/dL    Albumin/Globulin Ratio NOT REPORTED 1.0 - 2.5    GFR Non-African American  >60 mL/min     Pediatric GFR requires additional information. Refer to [facility] website for calculator.     GFR  NOT REPORTED >60 mL/min    GFR Comment          GFR Staging NOT REPORTED    CBC    Collection Time: 06/17/20  6:52 AM   Result Value Ref Range    WBC 5.9 4.5 - 13.5 k/uL    RBC 4.77 4.5 - 5.9 m/uL    Hemoglobin 14.4 13.5 - 17.5 g/dL    Hematocrit 42.2 41 - 53 %    MCV 88.5 80 - 100 fL    MCH 30.2 26 - 34 pg    MCHC 34.1 31 - 37 g/dL    RDW 13.4 11.5 - 14.9 %    Platelets 283 772 - 409 k/uL    MPV 8.8 6.0 - 12.0 fL    NRBC Automated NOT REPORTED per 100 WBC       Imaging/Diagonstics:      Assessment :      Primary

## 2020-06-18 NOTE — BH NOTE
Patient is a non smoker, no smoking education needed at this time.
Patient was seen by Dr. Ivone Martínez via telehealth.
RN

## 2021-08-09 NOTE — PLAN OF CARE
Problem: Depressive Behavior With or Without Suicide Precautions:  Goal: Able to verbalize and/or display a decrease in depressive symptoms  Description: Able to verbalize and/or display a decrease in depressive symptoms  6/18/2020 0032 by Bri Banks LPN  Outcome: Met This Shift  Note: Pt denies depressive symptoms at this time, Pt is seen out in the dayroom social with peers with a normal affect. No concerns or questions at this time. Problem: Depressive Behavior With or Without Suicide Precautions:  Goal: Ability to disclose and discuss suicidal ideas will improve  Description: Ability to disclose and discuss suicidal ideas will improve  6/18/2020 0032 by Bri Banks LPN  Outcome: Met This Shift  Note: Pt denies suicidal ideation at this time, Pt agrees to seek staff help should the thought of suicide arise. Staff will provide reassurance as needed. 15 minute checks maintained for patient safety.
Limits         Patient Currently in Pain: Denies  Daily Nutrition (WDL): Within Defined Limits    Patient Monitoring:  Frequency of Checks: 4 times per hour, close    Psychiatric Symptoms:   Depression Symptoms  Depression Symptoms: Impaired concentration, Feelings of hopelessess  Anxiety Symptoms  Anxiety Symptoms: Generalized  Junie Symptoms  Junie Symptoms: Poor judgment     Psychosis Symptoms  Delusion Type: No problems reported or observed. Suicide Risk CSSR-S:  1) Within the past month, have you wished you were dead or wished you could go to sleep and not wake up? : Yes  2) Have you actually had any thoughts of killing yourself? : Yes  3) Have you been thinking about how you might kill yourself? : Yes  5) Have you started to work out or worked out the details of how to kill yourself?  Do you intend to carry out this plan? : No  6) Have you ever done anything, started to do anything, or prepared to do anything to end your life?: Yes  Change in Result no Change in Plan of care no      EDUCATION:   EDUCATION:   Learner Progress Toward Treatment Goals: Reviewed results and recommendations of this team, Reviewed group plan and strategies, Reviewed signs, symptoms and risk of self harm and violent behavior, Reviewed goals and plan of care    Method:small group, individual verbal education    Outcome:verbalized by patient, but needs reinforcement to obtain goals    PATIENT GOALS:  Short term: keep working on staying safe    Long term: continue follow up treatment     PLAN/TREATMENT RECOMMENDATIONS UPDATE: continue with group therapies, increased socialization, continue planning for after discharge goals, continue with medication compliance    SHORT-TERM GOALS UPDATE:   Time frame for Short-Term Goals: 5-7 days    LONG-TERM GOALS UPDATE:   Time frame for Long-Term Goals: 6 months  Members Present in Team Meeting: See Signature Sheet    GIBRAN Foster
COVID vaccinate 2/21 x 2 Pfizer  Shingles no  Pneumonia no

## 2022-09-14 NOTE — H&P
medications    Medication Sig Start Date End Date Taking? Authorizing Provider   sertraline (ZOLOFT) 25 MG tablet Take 1 tablet by mouth daily 20   Yes CRAIG Daugherty CNP   traZODone (DESYREL) 50 MG tablet Take 1 tablet by mouth nightly as needed for Sleep 4/3/20   Yes CRAIG Daugherty CNP            Allergies:      Patient has no known allergies.     Social History:      Tobacco:    reports that he has never smoked. He has never used smokeless tobacco.  Alcohol:      reports no history of alcohol use. Drug Use:  reports current drug use. Drug: Marijuana.     Family History:      Family History   No family history on file.        Review of Systems:      Positive and Negative as described in HPI.     CONSTITUTIONAL:  negative for fevers, chills, sweats, fatigue, weight loss  HEENT:  negative for vision, hearing changes, runny nose, throat pain  RESPIRATORY:  negative for shortness of breath, cough, congestion, wheezing. CARDIOVASCULAR:  negative for chest pain, palpitations.   GASTROINTESTINAL:  negative for nausea, vomiting, diarrhea, constipation, change in bowel habits, abdominal pain   GENITOURINARY:  negative for difficulty of urination, burning with urination, frequency   INTEGUMENT:  negative for rash, skin lesions, easy bruising   HEMATOLOGIC/LYMPHATIC:  negative for swelling/edema   ALLERGIC/IMMUNOLOGIC:  negative for urticaria , itching  ENDOCRINE:  negative increase in drinking, increase in urination, hot or cold intolerance  MUSCULOSKELETAL:  negative joint pains, muscle aches, swelling of joints  NEUROLOGICAL:  negative for headaches, dizziness, lightheadedness, numbness, pain, tingling extremities  BEHAVIOR/PSYCH:  Positive for Depression      Physical Exam:      /65   Pulse 84   Temp 98.2 °F (36.8 °C)   Resp 14   Ht 5' 9\" (1.753 m)   Wt 134 lb (60.8 kg)   BMI 19.79 kg/m²   Temp (24hrs), Av.8 °F (36.6 °C), Min:97.4 °F (36.3 °C), Max:98.2 °F (36.8 °C)     No results for input(s): POCGLU in the last 72 hours. No intake or output data in the 24 hours ending 06/17/20 4171     General Appearance:  alert, well appearing, and in no acute distress  Mental status: oriented to person, place, and time with normal affect  Head:  normocephalic, atraumatic. Eye: no icterus, redness, pupils equal and reactive, extraocular eye movements intact, conjunctiva clear  Ear: normal external ear, no discharge, hearing intact  Nose:  no drainage noted  Mouth: mucous membranes moist  Neck: supple, no carotid bruits, thyroid not palpable  Lungs: Bilateral equal air entry, clear to ausculation, no wheezing, rales or rhonchi, normal effort  Cardiovascular: normal rate, regular rhythm, no murmur, gallop, rub.   Abdomen: Soft, nontender, nondistended, normal bowel sounds, no hepatomegaly or splenomegaly  Neurologic: There are no new focal motor or sensory deficits, normal muscle tone and bulk, no abnormal sensation, normal speech, cranial nerves II through XII grossly intact  Skin: No gross lesions, rashes, bruising or bleeding on exposed skin area  Extremities:  peripheral pulses palpable, no pedal edema or calf pain with palpation  Psych: normal affect     Investigations:       Laboratory Testing:  Recent Results          Recent Results (from the past 24 hour(s))   Comprehensive Metabolic Panel w/ Reflex to MG     Collection Time: 06/17/20  6:52 AM   Result Value Ref Range     Glucose 98 70 - 99 mg/dL     BUN 8 6 - 20 mg/dL     CREATININE 0.53 (L) 0.70 - 1.20 mg/dL     Bun/Cre Ratio NOT REPORTED 9 - 20     Calcium 9.4 8.6 - 10.4 mg/dL     Sodium 145 (H) 135 - 144 mmol/L     Potassium 3.9 3.7 - 5.3 mmol/L     Chloride 107 98 - 107 mmol/L     CO2 25 20 - 31 mmol/L     Anion Gap 13 9 - 17 mmol/L     Alkaline Phosphatase 71 40 - 129 U/L     ALT 8 5 - 41 U/L     AST 9 <40 U/L     Total Bilirubin 0.40 0.3 - 1.2 mg/dL     Total Protein 6.9 6.4 - 8.3 g/dL     Alb 4.2 3.5 - 5.2 g/dL     Albumin/Globulin Thalidomide Pregnancy And Lactation Text: This medication is Pregnancy Category X and is absolutely contraindicated during pregnancy. It is unknown if it is excreted in breast milk.